# Patient Record
Sex: FEMALE | Race: WHITE
[De-identification: names, ages, dates, MRNs, and addresses within clinical notes are randomized per-mention and may not be internally consistent; named-entity substitution may affect disease eponyms.]

---

## 2017-01-18 ENCOUNTER — HOSPITAL ENCOUNTER (OUTPATIENT)
Age: 62
Discharge: HOME | End: 2017-01-18
Payer: COMMERCIAL

## 2017-01-18 DIAGNOSIS — K62.5: Primary | ICD-10-CM

## 2017-01-18 DIAGNOSIS — R15.2: ICD-10-CM

## 2017-01-18 PROCEDURE — 45380 COLONOSCOPY AND BIOPSY: CPT

## 2017-01-18 PROCEDURE — 0DBE8ZX EXCISION OF LARGE INTESTINE, VIA NATURAL OR ARTIFICIAL OPENING ENDOSCOPIC, DIAGNOSTIC: ICD-10-PCS | Performed by: INTERNAL MEDICINE

## 2018-03-22 ENCOUNTER — HOSPITAL ENCOUNTER (OUTPATIENT)
Dept: HOSPITAL 76 - DI | Age: 63
Discharge: HOME | End: 2018-03-22
Attending: PHYSICIAN ASSISTANT
Payer: COMMERCIAL

## 2018-03-22 DIAGNOSIS — M47.896: Primary | ICD-10-CM

## 2018-03-22 PROCEDURE — 72100 X-RAY EXAM L-S SPINE 2/3 VWS: CPT

## 2018-03-23 NOTE — XRAY REPORT
THREE VIEW LUMBAR SPINE:  03/22/2018

 

CLINICAL INDICATION:  Back pain.

 

FINDINGS:  AP, lateral, coned down views of the lumbar spine demonstrate mild degenerative 

facet arthropathy.  There is no evidence of fracture or subluxation.  The bowel gas pattern is 

normal.

 

IMPRESSION:  MILD FACET ARTHROPATHY.

 

 

DD: 03/23/2018 10:47

TD: 03/23/2018 11:07

Job #: [id number]

## 2018-04-25 ENCOUNTER — HOSPITAL ENCOUNTER (OUTPATIENT)
Dept: HOSPITAL 76 - DI | Age: 63
Discharge: HOME | End: 2018-04-25
Attending: PHYSICIAN ASSISTANT
Payer: COMMERCIAL

## 2018-04-25 DIAGNOSIS — Z12.31: Primary | ICD-10-CM

## 2018-04-25 PROCEDURE — 77067 SCR MAMMO BI INCL CAD: CPT

## 2018-04-26 NOTE — MAMMOGRAPHY REPORT
DIGITAL SCREENING MAMMOGRAM:  04/25/2018

 

CLINICAL INDICATION:  A 62-year-old for screening.

 

COMPARISON:  06/2016, 05/2015, 04/2014, 04/2013, 03/2012, 03/2011, 03/2010.

 

TECHNIQUE:  Routine CC and MLO projections and bilateral laterally exaggerated craniocaudal 

views were obtained of the breasts.

 

FINDINGS:  Parenchymal tissue within both breasts is heterogeneously dense, which may lower the

sensitivity of mammography; however, there are no dominant masses, suspicious 

microcalcifications, or secondary signs of malignancy.  In comparison to the previous studies, 

there are no significant changes.

 

ASSESSMENT:  NO MAMMOGRAPHIC EVIDENCE OF MALIGNANCY.  NO SIGNIFICANT INTERVAL CHANGES.

 

RECOMMENDATION:  Screening mammography is recommended annually.

 

BIRADS category 1 - negative.

 

STANDARD QUALIFYING STATEMENTS:

1.  This examination was reviewed with the aid of Computed-Aided Detection (CAD).

2.  A negative or benign imaging report should not delay biopsy if clinically suspicious 

findings are present.  Consider surgical consultation if warranted.  More than 5% of cancers 

are not identified by imaging.

3.  Dense breasts may obscure an underlying neoplasm.

 

 

 

 

DD: 04/26/2018 13:08

TD: 04/26/2018 17:08

Job #: [id number]

## 2018-05-03 ENCOUNTER — HOSPITAL ENCOUNTER (OUTPATIENT)
Dept: HOSPITAL 76 - DI | Age: 63
Discharge: HOME | End: 2018-05-03
Attending: PHYSICIAN ASSISTANT
Payer: COMMERCIAL

## 2018-05-03 DIAGNOSIS — M81.0: ICD-10-CM

## 2018-05-03 DIAGNOSIS — E28.39: Primary | ICD-10-CM

## 2018-05-03 PROCEDURE — 77080 DXA BONE DENSITY AXIAL: CPT

## 2018-05-07 NOTE — DEXA REPORT
EXAM:  DEXA SCAN 05/03/2018

 

CLINICAL INDICATION:  Estrogen deficiency.

 

TECHNIQUE:  Dual energy x-ray absorptiometry (DXA) was performed on a  Spool
  system.  

Regions measured are the AP spine, femoral neck, and, if needed, forearm.

 

COMPARISON:  None.  In accordance with the International Society for Clinical 
Densitometry 

(ISCD) guidelines, previous exams data may be reanalyzed using current 
recommendations and 

techniques.  This is done to allow a more accurate basis for comparison with 
the current study.

 

 

FINDINGS

Data for the lumbar spine is as follows:





REGION BMD (g/cm/cm) T-SCORE Z-SCORE

 

L1 0.764 -3.1 -1.8

 

L2 0.900 -2.5 -1.3

 

L3 0.868 -2.8 -1.5

 

L4 0.878 -2.7 -1.4

 

L1-L4 0.855 -2.7 -1.5





NOTE:  All evaluable vertebrae are used for classification.

 

 

Data for the hip is as follows:





REGION BMD (g/cm/cm) T-SCORE Z-SCORE

 

Neck 0.903 -1.0 0.3

 

TOTAL 0.868 -1.1 -0.2





NOTE:  The femoral neck or total proximal femur, whichever is lowest, is used 
for classification.

 

 

IMPRESSION

WHO CLASSIFICATION BASED ON THE INTERNATIONAL REFERENCE STANDARD IS 
OSTEOPOROSIS.

FRACTURE RISK IS HIGH.

 

RECOMMENDATION:  Patients with diagnosis of osteoporosis or osteopenia should 
have regular bone

mineral density assessment. For those eligible for Medicare, routine testing is 
allowed once every 2 years. 

Testing frequency can be increased for patients who have rapidly progressing 
disease or for those who are 

receiving medical therapy to restore bone mass.

 

COMMENT

World Health Organization (WHO) definitions for osteoporosis and osteopenia:

NORMAL BMD:  T-score at 1.0 or higher, fracture risk is low.

OSTEOPENIA BMD:  T-score between 1.0 and -2.5, fracture risk is increased.

OSTEOPOROSIS BMD:  T-score at 2.5 or lower, fracture risk high.

 

National Osteoporosis Foundation recommends:

1. Obtain adequate dietary calcium (at least 1200 mg per day) and vitamin D (400
-800 international units per day).

2. Participate, as appropriate, in regular weightbearing and muscle-
strengthening exercise.

3. Avoid tobacco use and reduce alcohol and caffeine intake.

4. For more detailed information see the website at www.NOF.org.

 

 

DD: 05/04/2018 09:34

TD: 05/04/2018 11:38

Job #: [id number]

[email or web address]

## 2018-06-27 ENCOUNTER — HOSPITAL ENCOUNTER (EMERGENCY)
Dept: HOSPITAL 76 - ED | Age: 63
Discharge: HOME | End: 2018-06-27
Payer: COMMERCIAL

## 2018-06-27 VITALS — DIASTOLIC BLOOD PRESSURE: 98 MMHG | SYSTOLIC BLOOD PRESSURE: 135 MMHG

## 2018-06-27 DIAGNOSIS — S29.011A: Primary | ICD-10-CM
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
## 2018-06-27 DIAGNOSIS — E03.9: ICD-10-CM
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
## 2018-06-27 PROCEDURE — 99283 EMERGENCY DEPT VISIT LOW MDM: CPT

## 2018-06-27 NOTE — ED PHYSICIAN DOCUMENTATION
History of Present Illness





- Stated complaint


Stated Complaint: RIB PX





- Chief complaint


Chief Complaint: General





- History obtained from


History obtained from: Patient





- History of Present Illness


Timing: How many days ago (4)


Pain level max: 7


Pain level now: 4


Quality: aching, dull pain


Improved by: rest


Worsened by: movement, palpation





- Additonal information


Additional information: 


Patient is a 62-year-old female who complains of left-sided rib pain for the 

past 3-4 days.  Started when she was gardening and was pulling up a weed and 

felt something pop on her left side.  Has had continued pain since that time.  

Took ibuprofen once.  Noticed increased pain while swimming today.  No chest 

pain.  No palpitations.  No dyspnea.  Pain is worse with coughing or laughing.  

Also worse with movement





Review of Systems


Constitutional: denies: Fever, Chills


Cardiac: denies: Chest pain / pressure


Respiratory: denies: Cough


GI: denies: Nausea, Vomiting, Diarrhea


Skin: denies: Rash


Musculoskeletal: denies: Neck pain, Back pain


Neurologic: denies: Headache





PD PAST MEDICAL HISTORY





- Past Medical History


Past Medical History: Yes


Endocrine/Autoimmune: HyPOthyroidism





- Past Surgical History


Past Surgical History: Yes


General: Appendectomy, Colonoscopy





- Present Medications


Home Medications: 


 Ambulatory Orders











 Medication  Instructions  Recorded  Confirmed


 


Levothyroxine [Synthroid] 75 mcg PO QDAC 07/19/16 06/27/18


 


Lidocaine Patch 5% [Lidoderm Patch] 1 patch TOP DAILY PRN #10 patch 06/27/18 


 


Meloxicam [Mobic] 7.5 mg PO BID PRN #20 tablet 06/27/18 














- Allergies


Allergies/Adverse Reactions: 


 Allergies











Allergy/AdvReac Type Severity Reaction Status Date / Time


 


No Known Drug Allergies Allergy   Verified 06/27/18 11:15














- Social History


Does the pt smoke?: No


Smoking Status: Never smoker


Does the pt drink ETOH?: Yes


Does the pt have substance abuse?: No





- Immunizations


Immunizations are current?: Yes


Immunizations: TDAP >10years/unknown





PD ED PE NORMAL





- Vitals


Vital signs reviewed: Yes





- General


General: Alert and oriented X 3, No acute distress, Well developed/nourished





- HEENT


HEENT: Moist mucous membranes





- Neck


Neck: Supple, no meningeal sign





- Cardiac


Cardiac: RRR, Strong equal pulses





- Respiratory


Respiratory: No respiratory distress, Clear bilaterally, Other (L sided rib  - 

TTP over the the anterior axillary line, 5-6th ribs. no crepitus. no 

ecchymosis. NVI)





- Abdomen


Abdomen: Soft, Non tender, Non distended





- Back


Back: No spinal TTP





- Derm


Derm: Warm and dry, No rash





- Extremities


Extremities: No edema, No calf tenderness / cord





- Neuro


Neuro: Alert and oriented X 3





- Psych


Psych: Normal mood, Normal affect





Results





- Vitals


Vitals: 


 Vital Signs - 24 hr











  06/27/18 06/27/18





  11:13 13:53


 


Temperature 36.3 C L 


 


Heart Rate 51 L 60


 


Respiratory 17 16





Rate  


 


Blood Pressure 146/83 H 135/98 H


 


O2 Saturation 93 99








 Oxygen











O2 Source                      Room air

















- Rads (name of study)


  ** L rib w/ chest


Radiology: Prelim report reviewed, EMP read contemporaneously, See rad report (

Normal chest and rib radiography. No displaced fracture )





PD MEDICAL DECISION MAKING





- ED course


Complexity details: reviewed results, re-evaluated patient, considered 

differential, d/w patient


ED course: 





Patient is a 62-year-old female who presents to the emergency department with 

left-sided chest wall pain.  No acute findings on x-ray.  Will trial on 

nonsteroidal anti-inflammatory medications at home and follow-up closely with 

her doctor.  No pneumothorax.  Patient counseled regarding signs and symptoms 

for which I believe and urgent re-evaluation would be necessary. Patient with 

good understanding of and agreement to plan and is comfortable going home at 

this time





This document was made in part using voice recognition software. While efforts 

are made to proofread this document, sound alike and grammatical errors may 

occur.





- Sepsis Event


Vital Signs: 


 Vital Signs - 24 hr











  06/27/18 06/27/18





  11:13 13:53


 


Temperature 36.3 C L 


 


Heart Rate 51 L 60


 


Respiratory 17 16





Rate  


 


Blood Pressure 146/83 H 135/98 H


 


O2 Saturation 93 99








 Oxygen











O2 Source                      Room air

















Departure





- Departure


Disposition: 01 Home, Self Care


Clinical Impression: 


Strain of chest wall


Qualifiers:


 Encounter type: initial encounter Qualified Code(s): S29.011A - Strain of 

muscle and tendon of front wall of thorax, initial encounter





Condition: Good


Instructions:  ED Chest Pain Costochondritis


Follow-Up: 


Barb Velez PA [Primary Care Provider] - Within 1 week (if not better)


Prescriptions: 


Lidocaine Patch 5% [Lidoderm Patch] 1 patch TOP DAILY PRN #10 patch


 PRN Reason: pain


Meloxicam [Mobic] 7.5 mg PO BID PRN #20 tablet


 PRN Reason: Pain


Comments: 


Return if you worsen.  This should improve over the next week or so.  Your x-

rays are normal today


Discharge Date/Time: 06/27/18 13:53

## 2018-06-27 NOTE — XRAY REPORT
Procedure Date:  06/27/2018   

Accession Number:  [id number] / [id number]                    

Procedure:  XR  - Ribs w/PA Chest LT CPT Code:  

 

FULL RESULT:

 

 

EXAM:

LEFT RIB RADIOGRAPHY

 

EXAM DATE: 6/27/2018 12:40 PM.

 

CLINICAL HISTORY: L side rib pain for 4 days after gardening

 

COMPARISON: None.

 

TECHNIQUE: 1 view of the chest and 2 views of the ribs.

 

FINDINGS:

Bones: Normal. No displaced fracture or bone lesion.

 

Lungs: No focal opacities. No pneumothorax. No pleural effusions.

 

Mediastinum: Heart and mediastinal contours are unremarkable.

 

Other: None.

IMPRESSION: Normal chest and rib radiography. No displaced fracture 

identified.

 

RADIA

## 2019-01-28 ENCOUNTER — HOSPITAL ENCOUNTER (EMERGENCY)
Dept: HOSPITAL 76 - ED | Age: 64
Discharge: HOME | End: 2019-01-28
Payer: COMMERCIAL

## 2019-01-28 VITALS — SYSTOLIC BLOOD PRESSURE: 109 MMHG | DIASTOLIC BLOOD PRESSURE: 77 MMHG

## 2019-01-28 DIAGNOSIS — B97.89: ICD-10-CM

## 2019-01-28 DIAGNOSIS — J06.9: Primary | ICD-10-CM

## 2019-01-28 LAB
ALBUMIN DIAFP-MCNC: 4.1 G/DL (ref 3.2–5.5)
ALBUMIN/GLOB SERPL: 1.7 {RATIO} (ref 1–2.2)
ALP SERPL-CCNC: 88 IU/L (ref 42–121)
ALT SERPL W P-5'-P-CCNC: 29 IU/L (ref 10–60)
ANION GAP SERPL CALCULATED.4IONS-SCNC: 8 MMOL/L (ref 6–13)
AST SERPL W P-5'-P-CCNC: 38 IU/L (ref 10–42)
BASOPHILS NFR BLD AUTO: 0 10^3/UL (ref 0–0.1)
BASOPHILS NFR BLD AUTO: 1 %
BILIRUB BLD-MCNC: 0.5 MG/DL (ref 0.2–1)
BUN SERPL-MCNC: 10 MG/DL (ref 6–20)
CALCIUM UR-MCNC: 8.8 MG/DL (ref 8.5–10.3)
CASTS URNS QL MICRO: (no result) /LPF
CHLORIDE SERPL-SCNC: 98 MMOL/L (ref 101–111)
CLARITY UR REFRACT.AUTO: CLEAR
CO2 SERPL-SCNC: 25 MMOL/L (ref 21–32)
CREAT SERPLBLD-SCNC: 0.7 MG/DL (ref 0.4–1)
EOSINOPHIL # BLD AUTO: 0 10^3/UL (ref 0–0.7)
EOSINOPHIL NFR BLD AUTO: 0.4 %
ERYTHROCYTE [DISTWIDTH] IN BLOOD BY AUTOMATED COUNT: 13.5 % (ref 12–15)
GFRSERPLBLD MDRD-ARVRAT: 85 ML/MIN/{1.73_M2} (ref 89–?)
GLOBULIN SER-MCNC: 2.4 G/DL (ref 2.1–4.2)
GLUCOSE SERPL-MCNC: 115 MG/DL (ref 70–100)
GLUCOSE UR QL STRIP.AUTO: NEGATIVE MG/DL
HGB UR QL STRIP: 14 G/DL (ref 12–16)
KETONES UR QL STRIP.AUTO: NEGATIVE MG/DL
LIPASE SERPL-CCNC: 28 U/L (ref 22–51)
LYMPHOCYTES # SPEC AUTO: 0.4 10^3/UL (ref 1.5–3.5)
LYMPHOCYTES NFR BLD AUTO: 8.3 %
MCH RBC QN AUTO: 32.1 PG (ref 27–31)
MCHC RBC AUTO-ENTMCNC: 35.6 G/DL (ref 32–36)
MCV RBC AUTO: 90.3 FL (ref 81–99)
MONOCYTES # BLD AUTO: 0.6 10^3/UL (ref 0–1)
MONOCYTES NFR BLD AUTO: 12.1 %
NEUTROPHILS # BLD AUTO: 3.7 10^3/UL (ref 1.5–6.6)
NEUTROPHILS # SNV AUTO: 4.7 X10^3/UL (ref 4.8–10.8)
NEUTROPHILS NFR BLD AUTO: 78.2 %
NITRITE UR QL STRIP.AUTO: NEGATIVE
PDW BLD AUTO: 6.8 FL (ref 7.9–10.8)
PH UR STRIP.AUTO: 8 PH (ref 5–7.5)
PLATELET # BLD: 97 10^3/UL (ref 130–450)
PROT SPEC-MCNC: 6.5 G/DL (ref 6.7–8.2)
PROT UR STRIP.AUTO-MCNC: NEGATIVE MG/DL
RBC # UR STRIP.AUTO: (no result) /UL
RBC # URNS HPF: (no result) /HPF (ref 0–5)
RBC MAR: 4.36 10^6/UL (ref 4.2–5.4)
SODIUM SERPLBLD-SCNC: 131 MMOL/L (ref 135–145)
SP GR UR STRIP.AUTO: 1.01 (ref 1–1.03)
SQUAMOUS #/AREA URNS HPF: (no result) /HPF
SQUAMOUS URNS QL MICRO: (no result)
UROBILINOGEN UR QL STRIP.AUTO: (no result) E.U./DL
UROBILINOGEN UR STRIP.AUTO-MCNC: NEGATIVE MG/DL

## 2019-01-28 PROCEDURE — 83690 ASSAY OF LIPASE: CPT

## 2019-01-28 PROCEDURE — 36415 COLL VENOUS BLD VENIPUNCTURE: CPT

## 2019-01-28 PROCEDURE — 87086 URINE CULTURE/COLONY COUNT: CPT

## 2019-01-28 PROCEDURE — 80053 COMPREHEN METABOLIC PANEL: CPT

## 2019-01-28 PROCEDURE — 81003 URINALYSIS AUTO W/O SCOPE: CPT

## 2019-01-28 PROCEDURE — 81001 URINALYSIS AUTO W/SCOPE: CPT

## 2019-01-28 PROCEDURE — 99283 EMERGENCY DEPT VISIT LOW MDM: CPT

## 2019-01-28 PROCEDURE — 85025 COMPLETE CBC W/AUTO DIFF WBC: CPT

## 2019-01-28 PROCEDURE — 71046 X-RAY EXAM CHEST 2 VIEWS: CPT

## 2019-01-28 NOTE — XRAY REPORT
Reason:  cough and fever

Procedure Date:  01/28/2019   

Accession Number:  [id number] / [id number]                    

Procedure:  XR  - Chest 2 View X-Ray CPT Code:  66727

 

FULL RESULT:

 

 

EXAM:

CHEST RADIOGRAPHY

 

EXAM DATE: 1/28/2019 08:03 AM.

 

CLINICAL HISTORY: Cough and fever.

 

COMPARISON: Frontal chest as part of rib series 06/27/2018.

 

TECHNIQUE: 2 views.

 

FINDINGS:

Lungs/Pleura: Stable mildly prominent lung markings, nonspecific free. No 

consolidation or vascular congestion. No pneumothorax or pleural 

effusions. Borderline hyperinflation. Bilateral nipple shadow artifact on 

frontal view.

 

Mediastinum: Stable normal heart size and mediastinal contour.

 

Other: No acute fracture.

IMPRESSION:

1. No acute cardiopulmonary findings or change.

2. Borderline hyperinflation.

 

RADIA

## 2019-01-28 NOTE — ED PHYSICIAN DOCUMENTATION
History of Present Illness





- Stated complaint


Stated Complaint: FEVER





- Chief complaint


Chief Complaint: Fever





- History obtained from


History obtained from: Patient





- History of Present Illness


Timing: How many days ago (3)





- Treatment prior to arrival


Treatment prior to arrival: 





200 mg Ibuprophen.





- Additonal information


Additional information: 


The patient is a 63-year-old female who presents with fever and myalgias.  She 

first noticed fever 3 days ago.  She has had cough for the past 4 days.  She 

denies dyspnea or sputum production.  She reports generalized headache.  She 

denies sore throat, abdominal pain, vomiting or diarrhea, or dysuria.  She 

reports having an upper respiratory infection 1 month ago.  Her  became 

sick with fever last week.


1 hour prior to arrival she took 200 mg dose of ibuprofen.








Review of Systems


Constitutional: reports: Fever, Myalgias


Eyes: denies: Discharge


Ears: denies: Tinnitus/ringing


Nose: denies: Congestion


Throat: denies: Sore throat


Cardiac: denies: Chest pain / pressure


Respiratory: reports: Cough.  denies: Dyspnea


GI: denies: Abdominal Pain, Nausea, Vomiting


: denies: Dysuria


Skin: denies: Rash


Musculoskeletal: denies: Back pain, Extremity pain


Neurologic: reports: Headache.  denies: Focal weakness, Numbness





PD PAST MEDICAL HISTORY





- Past Medical History


Respiratory: None


Endocrine/Autoimmune: HyPOthyroidism





- Past Surgical History


Past Surgical History: Yes


General: Appendectomy, Colonoscopy





- Present Medications


Home Medications: 


                                Ambulatory Orders











 Medication  Instructions  Recorded  Confirmed


 


Levothyroxine [Synthroid] 75 mcg PO QDAC 07/19/16 06/27/18














- Allergies


Allergies/Adverse Reactions: 


                                    Allergies











Allergy/AdvReac Type Severity Reaction Status Date / Time


 


No Known Drug Allergies Allergy   Verified 01/28/19 07:45














- Living Situation


Living Situation: reports: With spouse/s.o.





- Social History


Does the pt smoke?: No


Smoking Status: Never smoker


Does the pt drink ETOH?: Yes


Does the pt have substance abuse?: No





- Immunizations


Immunizations are current?: Yes


Immunizations: TDAP >10years/unknown





PD ED PE NORMAL





- Vitals


Vital signs reviewed: Yes (Febrile with temperature of 102.4.)





- General


General: Alert and oriented X 3, Well developed/nourished





- HEENT


HEENT: Atraumatic, EOMI, Pharynx benign





- Neck


Neck: Supple, no meningeal sign, No adenopathy





- Cardiac


Cardiac: RRR, No murmur





- Respiratory


Respiratory: No respiratory distress, Clear bilaterally





- Abdomen


Abdomen: Soft, Non tender





- Back


Back: No CVA TTP





- Derm


Derm: No rash





- Extremities


Extremities: No edema, No calf tenderness / cord





- Neuro


Neuro: Alert and oriented X 3, No motor deficit, No sensory deficit





Results





- Vitals


Vitals: 


                               Vital Signs - 24 hr











  01/28/19





  10:32


 


Temperature 36.7 C


 


Heart Rate 60


 


Respiratory 15





Rate 


 


Blood Pressure 109/77


 


O2 Saturation 97








                                     Oxygen











O2 Source                      Room air

















- Labs


Labs: 


                                Laboratory Tests











  01/28/19 01/28/19 01/28/19





  07:50 07:50 08:25


 


WBC  4.7 L  


 


RBC  4.36  


 


Hgb  14.0  


 


Hct  39.4  


 


MCV  90.3  


 


MCH  32.1 H  


 


MCHC  35.6  


 


RDW  13.5  


 


Plt Count  97 L  


 


MPV  6.8 L  


 


Neut # (Auto)  3.7  


 


Lymph # (Auto)  0.4 L  


 


Mono # (Auto)  0.6  


 


Eos # (Auto)  0.0  


 


Baso # (Auto)  0.0  


 


Absolute Nucleated RBC  0.00  


 


Nucleated RBC %  0.1  


 


Sodium   131 L 


 


Potassium   4.0 


 


Chloride   98 L 


 


Carbon Dioxide   25 


 


Anion Gap   8.0 


 


BUN   10 


 


Creatinine   0.7 


 


Estimated GFR (MDRD)   85 L 


 


Glucose   115 H 


 


Calcium   8.8 


 


Total Bilirubin   0.5 


 


AST   38 


 


ALT   29 


 


Alkaline Phosphatase   88 


 


Total Protein   6.5 L 


 


Albumin   4.1 


 


Globulin   2.4 


 


Albumin/Globulin Ratio   1.7 


 


Lipase   28 


 


Urine Color    YELLOW


 


Urine Clarity    CLEAR


 


Urine pH    8.0 H


 


Ur Specific Gravity    1.015


 


Urine Protein    NEGATIVE


 


Urine Glucose (UA)    NEGATIVE


 


Urine Ketones    NEGATIVE


 


Urine Occult Blood    TRACE-LYSE


 


Urine Nitrite    NEGATIVE


 


Urine Bilirubin    NEGATIVE


 


Urine Urobilinogen    0.2 (NORMAL)


 


Ur Leukocyte Esterase    SMALL H


 


Urine RBC    6-10 H


 


Urine WBC    4-5


 


Ur Epithelial Cells    FEW Transitional


 


Ur Squamous Epith Cells    MOD Squamous H


 


Urine Bacteria    Moderate H


 


Urine Casts    0-2 Granular Casts


 


Ur Microscopic Review    INDICATED


 


Urine Culture Comments    NOT INDICATED














- Rads (name of study)


  ** CXR


Radiology: Prelim report reviewed, EMP read contemporaneously, See rad report 

(No acute cardiopulmonary findings or change.  Borderline hyperinflation.)





PD MEDICAL DECISION MAKING





- ED course


Complexity details: reviewed results, re-evaluated patient, considered 

differential, d/w patient


ED course: 


The patient's presentation is most consistent with viral upper respiratory 

infection, with associated cough and fever.  Her chest x-ray reveals no evidence

 of pneumonia.  Her presentation does not suggest peritonsillar abscess or 

sepsis.  Treatment in the emergency department included administration of 

ibuprofen 600 mg orally.  I discussed with her the expected course of illness, 

symptomatic treatment and outpatient follow-up, as well as potentially worrisome

 signs or symptoms that should prompt reevaluation in the emergency department.








Departure





- Departure


Disposition: 01 Home, Self Care


Clinical Impression: 


 Viral upper respiratory infection, Viral syndrome





Condition: Stable


Instructions:  ED URI Viral


Follow-Up: 


Barb Velez PA [Primary Care Provider] - 


Comments: 


Drink plenty of fluids.


You can use ibuprofen, up to 800 mg 3 times daily for fever or discomfort.


Follow-up with your primary physician within 2 weeks.  Call to schedule an 

appointment.


Return to the emergency department if you develop increasing difficulty 

breathing, or otherwise worsening symptoms.


Discharge Date/Time: 01/28/19 10:33

## 2019-07-24 ENCOUNTER — HOSPITAL ENCOUNTER (OUTPATIENT)
Age: 64
End: 2019-07-24
Payer: COMMERCIAL

## 2019-07-24 DIAGNOSIS — R00.0: ICD-10-CM

## 2019-07-24 DIAGNOSIS — R53.83: ICD-10-CM

## 2019-07-24 DIAGNOSIS — E03.9: Primary | ICD-10-CM

## 2019-07-24 DIAGNOSIS — R25.2: ICD-10-CM

## 2019-07-24 DIAGNOSIS — R06.02: ICD-10-CM

## 2019-07-24 DIAGNOSIS — M81.0: ICD-10-CM

## 2019-07-24 LAB
25(OH)D3+25(OH)D2 SERPL-MCNC: 35.2 NG/ML (ref 30–100)
ALBUMIN SERPL-MCNC: 4.4 G/DL (ref 3.5–5)
ALBUMIN/GLOB SERPL: 2 {RATIO} (ref 1–2.8)
ALP SERPL-CCNC: 93 U/L (ref 38–126)
ALT SERPL-CCNC: 45 IU/L (ref 9–52)
BUN SERPL-MCNC: 17 MG/DL (ref 7–17)
CALCIUM SERPL-MCNC: 9.4 MG/DL (ref 8.4–10.2)
CHLORIDE SERPL-SCNC: 102 MMOL/L (ref 98–107)
CO2 SERPL-SCNC: 28 MMOL/L (ref 22–32)
ESTIMATED GLOMERULAR FILT RATE: > 60 ML/MIN (ref 60–?)
GLOBULIN SER CALC-MCNC: 2.2 G/DL (ref 1.7–4.1)
GLUCOSE SERPL-MCNC: 88 MG/DL (ref 80–110)
HEMOLYSIS: < 15 (ref 0–50)
HEMOLYSIS: < 15 (ref 0–50)
IRON SERPL-MCNC: 131 UG/DL (ref 37–170)
MAGNESIUM SERPL-MCNC: 2.1 MG/DL (ref 1.6–2.3)
PERCENT IRON SATURATION: 36 % (ref 15–50)
POTASSIUM SERPL-SCNC: 4.2 MMOL/L (ref 3.4–5.1)
PROT SERPL-MCNC: 6.6 G/DL (ref 6.3–8.2)
SODIUM SERPL-SCNC: 140 MMOL/L (ref 137–145)
TIBC SERPL-MCNC: 365 UG/DL (ref 265–497)
TRANSFERRIN SERPL-MCNC: 311 MG/DL (ref 206–381)
TSH SERPL DL<=0.05 MIU/L-ACNC: 0.02 UIU/ML (ref 0.47–4.68)
VIT B12 SERPL-MCNC: 819 PG/ML (ref 239–931)

## 2019-07-24 PROCEDURE — 83735 ASSAY OF MAGNESIUM: CPT

## 2019-07-24 PROCEDURE — 36415 COLL VENOUS BLD VENIPUNCTURE: CPT

## 2019-07-24 PROCEDURE — 82607 VITAMIN B-12: CPT

## 2019-07-24 PROCEDURE — 80053 COMPREHEN METABOLIC PANEL: CPT

## 2019-07-24 PROCEDURE — 83540 ASSAY OF IRON: CPT

## 2019-07-24 PROCEDURE — 84443 ASSAY THYROID STIM HORMONE: CPT

## 2019-07-24 PROCEDURE — 82306 VITAMIN D 25 HYDROXY: CPT

## 2019-07-24 PROCEDURE — 83550 IRON BINDING TEST: CPT

## 2019-08-01 ENCOUNTER — HOSPITAL ENCOUNTER (OUTPATIENT)
Age: 64
End: 2019-08-01
Payer: COMMERCIAL

## 2019-08-01 DIAGNOSIS — R00.2: Primary | ICD-10-CM

## 2019-08-01 PROCEDURE — 0296T: CPT

## 2019-08-01 PROCEDURE — 0298T: CPT

## 2019-08-23 NOTE — P.HOLT.S_ITS
Cardiac Monitor Report    
Referral & Results    
Date Patient Seen: 08/01/19    
Requesting provider: Beena Velez    
Indication: Palpitations    
Duration of monitoring (days): 14    
Diary information: There was 5 patient triggered event associated with sinus   
rhythm    
Data: Minimum heart rate identified was 21 beats per minute at 23:52 on   
08/05/2019 during a 3 sec pause    
    
Minimum sinus heart rate was 35 beats per minute at 02:57 on 08/14/2019    
    
Maximum overall heart rate was 150 beats per minute during a 5 beat run of a   
narrow complex tachycardia with unusual morphology that was determined to be   
ventricular tachycardia by the computer but upon closer inspection does not   
appear to be ventricular in origin    
    
Patient had pauses including a 2nd pause as above    
    
Patient with accelerated supraventricular rhythm at times as well    
    
Less than 1% of identified beats rather ventricular supraventricular ectopic in   
origin    
    
There were also periods of second-degree AV block Mobitz type 1    
Impression:     
Multiple abnormalities as above the most significant of which is a 3 sec pause

## 2019-10-30 ENCOUNTER — HOSPITAL ENCOUNTER (OUTPATIENT)
Age: 64
End: 2019-10-30
Payer: COMMERCIAL

## 2019-10-30 DIAGNOSIS — Z13.9: Primary | ICD-10-CM

## 2019-10-30 PROCEDURE — 86787 VARICELLA-ZOSTER ANTIBODY: CPT

## 2019-10-30 PROCEDURE — 86735 MUMPS ANTIBODY: CPT

## 2019-10-30 PROCEDURE — 36415 COLL VENOUS BLD VENIPUNCTURE: CPT

## 2019-11-21 ENCOUNTER — HOSPITAL ENCOUNTER (OUTPATIENT)
Dept: HOSPITAL 73 - PHYS | Age: 64
End: 2019-11-21
Payer: COMMERCIAL

## 2019-11-21 DIAGNOSIS — G56.00: Primary | ICD-10-CM

## 2019-11-21 PROCEDURE — 95886 MUSC TEST DONE W/N TEST COMP: CPT

## 2019-11-21 PROCEDURE — 95911 NRV CNDJ TEST 9-10 STUDIES: CPT

## 2019-11-21 PROCEDURE — 95885 MUSC TST DONE W/NERV TST LIM: CPT

## 2020-07-15 ENCOUNTER — HOSPITAL ENCOUNTER (OUTPATIENT)
Dept: HOSPITAL 76 - DI | Age: 65
Discharge: HOME | End: 2020-07-15
Attending: FAMILY MEDICINE
Payer: COMMERCIAL

## 2020-07-15 DIAGNOSIS — M85.88: Primary | ICD-10-CM

## 2020-07-15 DIAGNOSIS — Z12.31: Primary | ICD-10-CM

## 2020-07-15 PROCEDURE — 77063 BREAST TOMOSYNTHESIS BI: CPT

## 2020-07-15 PROCEDURE — 77067 SCR MAMMO BI INCL CAD: CPT

## 2020-07-15 PROCEDURE — 77080 DXA BONE DENSITY AXIAL: CPT

## 2020-07-15 NOTE — DEXA REPORT
Reason:  OSTEOPOROSIS

Procedure Date:  07/15/2020   

Accession Number:  [id number] / [id number]                    

Procedure:  DEX - Dexa Spine and/or Hip CPT Code:  

 

***Final Report***

 

 

FULL RESULT:

 

 

PROCEDURE: Dexa Spine and/or Hip

 

INDICATIONS: OSTEOPOROSIS

 

TECHNIQUE: Dual energy x-ray absorptiometry (DXA) was performed on a [employer] System.  Regions measured are the AP Spine, femoral neck, and if 

needed forearm.

 

COMPARISON: 5/3/2018.

 

FINDINGS:

 

Lumbar Spine:

Bone Mineral Density   0.888 g/cm/cm,T score -2.4, osteopenia

 

Left Hip:

Bone Mineral Density  0.904 g/cm/cm,T score -0.8, normal

 

Left Femoral Neck:

Bone Mineral Density  0.899 g/cm/cm, T score -1.0, normal

 

 

 

(T score greater or equal to -1.0: NORMAL)

(T score from -1.1 to -2.4: OSTEOPENIA)

(T score less than or equal to -2.5 to: OSTEOPOROSIS)

 

Impression: Osteopenia.

 

Patients with diagnosis of osteoporosis or osteopenia should have regular 

bone mineral density assessment.  For those eligible for Medicare, 

routine testing is allowed once every 2 years.  Testing frequency can be 

increased for patients who have rapidly progressing disease or for those 

who are receiving medical therapy to restore bone mass.

 

Reviewed by: Kristie Nunez MD, PhD on 7/15/2020 5:06 PM PDT

Approved by: Kristie Nunez MD, PhD on 7/15/2020 5:06 PM PDT

 

 

Station ID:  [id number]

## 2020-07-16 NOTE — MAMMOGRAPHY REPORT
BILATERAL DIGITAL SCREENING MAMMOGRAM 3D/2D: 7/15/2020

 

CLINICAL: Routine screening.  

 

Comparison is made to exams dated:  4/25/2018 mammogram, 6/15/2016 mammogram, and 5/21/2015 mammogram
 - [facility].  The tissue of both breasts is heterogeneously dense. This may lower
 the sensitivity of mammography.  

 

No significant masses, calcifications, or other findings are seen in either breast.  

There has been no significant interval change.

 

IMPRESSION: NEGATIVE

There is no mammographic evidence of malignancy. A 1 year screening mammogram is recommended.  

 

 

This exam was interpreted at Station ID: [id number].  

 

NOTE: For mammograms, a report in lay terms will be sent to the patient. Approximately 15% of breast 
malignancies will not be visualized mammographically. In the management of a palpable breast mass, a 
negative mammogram must not discourage biopsy of a clinically suspicious lesion.

 

Electronically Signed By: Bonita valderrama/lilia:7/15/2020 13:09:22  

 

 

 

ACR BI-RADS Category 1: Negative 3341F

PARENCHYMAL PATTERN: (D) - The breast(s) demonstrate(s) heterogeneously dense fibroglandular kalina pierce.

BI-RADS CATEGORY: (1) - 1

RECOMMENDATION: (ANNUAL)  - Recommend routine annual screening mammography.

[id number]

1 year screening

LATERALITY: (B)

## 2020-10-13 ENCOUNTER — HOSPITAL ENCOUNTER (OUTPATIENT)
Dept: HOSPITAL 76 - DI | Age: 65
Discharge: HOME | End: 2020-10-13
Attending: PHYSICIAN ASSISTANT
Payer: COMMERCIAL

## 2020-10-13 DIAGNOSIS — M47.817: ICD-10-CM

## 2020-10-13 DIAGNOSIS — M62.838: ICD-10-CM

## 2020-10-13 DIAGNOSIS — M47.812: Primary | ICD-10-CM

## 2020-10-13 DIAGNOSIS — M48.02: ICD-10-CM

## 2020-10-13 DIAGNOSIS — G57.02: ICD-10-CM

## 2020-10-13 PROCEDURE — 72040 X-RAY EXAM NECK SPINE 2-3 VW: CPT

## 2020-10-13 PROCEDURE — 72100 X-RAY EXAM L-S SPINE 2/3 VWS: CPT

## 2020-10-13 PROCEDURE — 72220 X-RAY EXAM SACRUM TAILBONE: CPT

## 2020-10-13 NOTE — XRAY REPORT
PROCEDURE:  Sacrum/Coccyx

 

INDICATIONS:  CERVICALAGIA,LOW BACK PAIN

 

TECHNIQUE:  3 views of the sacrum and coccyx acquired.  

 

COMPARISON:  Lumbar spine 10/13/2020, 3/22/2018

 

FINDINGS:  

 

Bones:  No fractures or dislocations.  No suspicious bony lesions.  Moderate degenerative disc and fo
raminal narrowing are noted at L5-S1.

 

Soft tissues:  Visualized bowel gas pattern is normal.  No suspicious soft tissue densities.  

 

IMPRESSION:  

Degenerative changes most prominent at L5-S1.

 

Reviewed by: Any Gomez MD on 10/13/2020 1:43 PM PDT

Approved by: Any Gomez MD on 10/13/2020 1:43 PM PDT

 

 

Station ID:  [id number]

## 2020-10-13 NOTE — XRAY REPORT
PROCEDURE:  Lumbar Spine 2 View

 

INDICATIONS:  CERVICALAGIA,LOW BACK PAIN

 

TECHNIQUE:  2 views of the lumbar spine were acquired.  

 

COMPARISON:  None.

 

FINDINGS:  

 

Bones:  5 non-rib-bearing vertebrae are present.  There is minimal disc and foraminal narrowing noted
 at L5-S1. No vertebral body compression fractures.  No suspicious bony lesions.  

 

Soft tissues:  Overlying bowel gas pattern is normal.  No suspicious soft tissue calcifications.  

 

IMPRESSION:  Early degenerative changes noted at L5-S1.

 

Reviewed by: Any Gomez MD on 10/13/2020 1:38 PM PDT

Approved by: Any Gomez MD on 10/13/2020 1:38 PM PDT

 

 

Station ID:  [id number]

## 2020-10-13 NOTE — XRAY REPORT
PROCEDURE:  Cervical Spine 2 View

 

INDICATIONS:  CERVICALAGIA

 

TECHNIQUE:  3 view(s) of the cervical spine were acquired.  

 

COMPARISON:  None.

 

FINDINGS:  

 

Bones:  No fractures or dislocations to the C7-T1 level.  The lateral masses of C1 appear intact on t
he odontoid view.  No suspicious bony lesions.  There is straightening of overall cervical curvature.
 Mild to moderate multilevel disc space narrowing is present most severe at C5-6 and C6-7.

 

Soft tissues:  No prevertebral soft tissue swelling.  

 

IMPRESSION:  Cervical straightening and multiple degenerative disc space narrowing as above.

 

Reviewed by: Any Gomez MD on 10/13/2020 1:42 PM PDT

Approved by: Any Gomez MD on 10/13/2020 1:42 PM PDT

 

 

Station ID:  [id number]

## 2021-07-15 ENCOUNTER — HOSPITAL ENCOUNTER (OUTPATIENT)
Dept: HOSPITAL 76 - DI | Age: 66
Discharge: HOME | End: 2021-07-15
Attending: PHYSICIAN ASSISTANT
Payer: COMMERCIAL

## 2021-07-15 DIAGNOSIS — Z12.31: Primary | ICD-10-CM

## 2021-07-16 NOTE — MAMMOGRAPHY REPORT
BILATERAL DIGITAL SCREENING MAMMOGRAM 3D/2D: 7/15/2021

 

CLINICAL: Routine screening.  

 

Comparison is made to exams dated:  7/15/2020 mammogram, 4/25/2018 mammogram, 6/15/2016 mammogram, 5/
21/2015 mammogram, 4/25/2014 mammogram, and 4/22/2013 mammogram - [facility].  The 
tissue of both breasts is heterogeneously dense. This may lower the sensitivity of mammography.  

 

No significant masses, calcifications, or other findings are seen in either breast.  

There has been no significant interval change.

 

IMPRESSION: NEGATIVE

There is no mammographic evidence of malignancy. A 1 year screening mammogram is recommended.  

 

 

This exam was interpreted at Station ID: [id number].  

 

NOTE: For mammograms, a report in lay terms will be sent to the patient. Approximately 15% of breast 
malignancies will not be visualized mammographically. In the management of a palpable breast mass, a 
negative mammogram must not discourage biopsy of a clinically suspicious lesion.

 

Electronically Signed By: Patrick Merritt M.D., jr/lilia:7/15/2021 14:47:34  

 

 

 

ACR BI-RADS Category 1: Negative 3341F

PARENCHYMAL PATTERN: (D) - The breast(s) demonstrate(s) heterogeneously dense fibroglandular kalina pierce.

BI-RADS CATEGORY: (1) - 1

RECOMMENDATION: (ANNUAL)  - Recommend routine annual screening mammography.

[id number]

1 year screening

LATERALITY: (B)